# Patient Record
Sex: FEMALE | Race: WHITE | NOT HISPANIC OR LATINO | Employment: FULL TIME | URBAN - METROPOLITAN AREA
[De-identification: names, ages, dates, MRNs, and addresses within clinical notes are randomized per-mention and may not be internally consistent; named-entity substitution may affect disease eponyms.]

---

## 2022-11-04 ENCOUNTER — APPOINTMENT (OUTPATIENT)
Dept: RADIOLOGY | Facility: HOSPITAL | Age: 57
End: 2022-11-04

## 2022-11-04 ENCOUNTER — HOSPITAL ENCOUNTER (EMERGENCY)
Facility: HOSPITAL | Age: 57
Discharge: HOME | End: 2022-11-04
Attending: STUDENT IN AN ORGANIZED HEALTH CARE EDUCATION/TRAINING PROGRAM

## 2022-11-04 VITALS
SYSTOLIC BLOOD PRESSURE: 181 MMHG | BODY MASS INDEX: 29.99 KG/M2 | WEIGHT: 180 LBS | HEART RATE: 71 BPM | TEMPERATURE: 98.1 F | DIASTOLIC BLOOD PRESSURE: 98 MMHG | OXYGEN SATURATION: 100 % | RESPIRATION RATE: 20 BRPM | HEIGHT: 65 IN
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
## 2022-11-04 DIAGNOSIS — S62.101A CLOSED FRACTURE OF RIGHT WRIST, INITIAL ENCOUNTER: ICD-10-CM

## 2022-11-04 RX ORDER — KETOROLAC TROMETHAMINE 30 MG/ML
30 INJECTION, SOLUTION INTRAMUSCULAR; INTRAVENOUS ONCE
Status: COMPLETED | OUTPATIENT
Start: 2022-11-04 | End: 2022-11-04

## 2022-11-04 RX ORDER — HYDROCODONE BITARTRATE AND ACETAMINOPHEN 5; 325 MG/1; MG/1
1 TABLET ORAL EVERY 4 HOURS PRN
Qty: 18 TABLET | Refills: 0 | Status: SHIPPED | OUTPATIENT
Start: 2022-11-04 | End: 2022-11-07

## 2022-11-04 RX ORDER — HYDROCODONE BITARTRATE AND ACETAMINOPHEN 5; 325 MG/1; MG/1
1 TABLET ORAL ONCE
Status: COMPLETED | OUTPATIENT
Start: 2022-11-04 | End: 2022-11-04

## 2022-11-04 RX ORDER — DULAGLUTIDE 1.5 MG/.5ML
1.5 INJECTION, SOLUTION SUBCUTANEOUS WEEKLY
COMMUNITY
Start: 2022-10-30

## 2022-11-04 RX ORDER — KETOROLAC TROMETHAMINE 30 MG/ML
60 INJECTION, SOLUTION INTRAMUSCULAR; INTRAVENOUS ONCE
Status: DISCONTINUED | OUTPATIENT
Start: 2022-11-04 | End: 2022-11-04

## 2022-11-04 RX ADMIN — HYDROCODONE BITARTRATE AND ACETAMINOPHEN 1 TABLET: 5; 325 TABLET ORAL at 14:29

## 2022-11-04 RX ADMIN — KETOROLAC TROMETHAMINE 30 MG: 30 INJECTION, SOLUTION INTRAMUSCULAR at 14:29

## 2022-11-04 NOTE — DISCHARGE INSTRUCTIONS
Keep on splint until follow-up with orthopedic doctor, on Monday as scheduled  Use Tylenol 650 mg every 4 hours or Anti-inflammatories like Advil, Motrin, Ibuprofen, Aleve every 6 hours; you can alternate the 2 medications taking something every 3 hours for pain, if no improvement add Hydrocodone as scheduled

## 2022-11-04 NOTE — ED PROVIDER NOTES
History  Chief Complaint   Patient presents with   • Fall     Pt "I was walking through town when I tripped over a traffic sign  I put my hands out when I fell " Pt denies LOC, CP and SOB  Pt c/o pain in the right arm region      Past Medical History: Diabetes mellitus, Fibromyalgia, OA, Osteopenia   Past Surgical History: B/L CATARACT EXTRACTION, HYSTERECTOMY, Right KNEE ARTHROSCOPY, TONSILECTOMY AND ADNOIDECTOMY, Left WRIST FRACTURE SURGERY  Presents to ED after trip and fall onto outstretched right hand/wrist immed PTA when she was walking through town and tripped over a traffic sign  Now with pain, swelling, deformity of right wrist   Pt denies hitting head, no LOC, no other injuries or complaints  Has been ambulatory since          Prior to Admission Medications   Prescriptions Last Dose Informant Patient Reported? Taking? Dulaglutide (Trulicity) 1 5 FF/1 5LF SOPN   Yes Yes   Sig: Inject 1 5 mg under the skin once a week      Facility-Administered Medications: None       Past Medical History:   Diagnosis Date   • Diabetes mellitus ([facility] [address] )    • Fibromyalgia    • OA (osteoarthritis)    • Osteopenia        Past Surgical History:   Procedure Laterality Date   • CATARACT EXTRACTION Bilateral    • HYSTERECTOMY     • KNEE ARTHROSCOPY Right    • TONSILECTOMY AND ADNOIDECTOMY     • TONSILLECTOMY     • WRIST FRACTURE SURGERY Left        No family history on file  I have reviewed and agree with the history as documented  E-Cigarette/Vaping     E-Cigarette/Vaping Substances     Social History     Tobacco Use   • Smoking status: Never Smoker   Substance Use Topics   • Alcohol use: Not Currently   • Drug use: Not Currently       Review of Systems   Constitutional: Negative for chills and fever  HENT: Negative for hearing loss and sore throat  Eyes: Negative for visual disturbance  Respiratory: Negative for cough and shortness of breath  Cardiovascular: Negative for chest pain     Gastrointestinal: Negative for abdominal pain, diarrhea and vomiting  Genitourinary: Negative for dysuria and frequency  Musculoskeletal: Positive for arthralgias, joint swelling and myalgias  Negative for gait problem  Skin: Negative for wound  Neurological: Negative for dizziness, weakness and numbness  Psychiatric/Behavioral: Negative for behavioral problems  All other systems reviewed and are negative  Physical Exam  Physical Exam  Vitals and nursing note reviewed  Constitutional:       General: She is in acute distress  Appearance: She is well-developed  HENT:      Head: Normocephalic and atraumatic  Right Ear: External ear normal       Left Ear: External ear normal       Nose: Nose normal       Mouth/Throat:      Mouth: Mucous membranes are moist       Pharynx: Oropharynx is clear  Eyes:      Conjunctiva/sclera: Conjunctivae normal    Cardiovascular:      Rate and Rhythm: Normal rate and regular rhythm  Pulmonary:      Effort: Pulmonary effort is normal  No respiratory distress  Breath sounds: Normal breath sounds  Musculoskeletal:         General: Swelling, tenderness and signs of injury present  Cervical back: Normal range of motion and neck supple  No tenderness  Comments: STS, tenderness noted to right wrist with decreased range of motion , distal NV intact,  no pain along digits, mid to proximal forearm and elbow, shoulder   Skin:     General: Skin is warm and dry  Capillary Refill: Capillary refill takes less than 2 seconds  Findings: No bruising or lesion  Neurological:      Mental Status: She is alert  Motor: No weakness        Gait: Gait normal    Psychiatric:         Behavior: Behavior normal          Vital Signs  ED Triage Vitals   Temperature Pulse Respirations Blood Pressure SpO2   11/04/22 1338 11/04/22 1329 11/04/22 1329 11/04/22 1329 11/04/22 1329   98 1 °F (36 7 °C) 71 20 (!) 181/98 100 %      Temp Source Heart Rate Source Patient Position - Orthostatic VS BP Location FiO2 (%)   11/04/22 1338 11/04/22 1329 11/04/22 1329 11/04/22 1329 --   Oral Monitor Sitting Left arm       Pain Score       11/04/22 1329       10 - Worst Possible Pain           Vitals:    11/04/22 1329   BP: (!) 181/98   Pulse: 71   Patient Position - Orthostatic VS: Sitting         Visual Acuity      ED Medications  Medications   HYDROcodone-acetaminophen (NORCO) 5-325 mg per tablet 1 tablet (1 tablet Oral Given 11/4/22 1429)   ketorolac (TORADOL) injection 30 mg (30 mg Intramuscular Given 11/4/22 1429)       Diagnostic Studies  Results Reviewed     None                 XR wrist 3+ views RIGHT   Final Result by Vel Bose MD (11/04 1420)      Acute, comminuted, impacted, intra-articular, and angulated distal radial fracture  The study was marked in [facility] for immediate notification  Workstation performed: [id number]                    Procedures  Splint application    Date/Time: 11/4/2022 3:10 PM  Performed by: Geovanna Perkins PA-C  Authorized by: Geovanna Perkins PA-C   Universal Protocol:  Procedure performed by: (ED tech)  Consent: Verbal consent obtained  Risks and benefits: risks, benefits and alternatives were discussed  Consent given by: patient  Time out: Immediately prior to procedure a "time out" was called to verify the correct patient, procedure, equipment, support staff and site/side marked as required  Patient understanding: patient states understanding of the procedure being performed  Patient identity confirmed: verbally with patient      Pre-procedure details:     Sensation:  Normal    Skin color:  Normal  Procedure details:     Laterality:  Right    Location:  Wrist    Wrist:  R wrist    Splint type:  Sugar tong    Supplies:  Cotton padding, Ortho-Glass and elastic bandage  Post-procedure details:     Pain:  Improved    Sensation:  Normal    Skin color:  Normal    Patient tolerance of procedure:   Tolerated well, no immediate complications  Comments:      Rking placed by tech             ED Course                                             MDM  Number of Diagnoses or Management Options  Closed fracture of right wrist, initial encounter  Fall, initial encounter  Diagnosis management comments: TT with ortho on call, Dr Leigha Corral, agrees with plan to splint and dc to FU as outpt  Pt lives about 4 hrs away; Patient was able to call and get a follow-up appoint with her orthopedist on Monday in the morning where she lives  CD with xrays given to pt/family       Amount and/or Complexity of Data Reviewed  Tests in the radiology section of CPT®: ordered and reviewed        Disposition  Final diagnoses:   Fall, initial encounter   Closed fracture of right wrist, initial encounter     Time reflects when diagnosis was documented in both MDM as applicable and the Disposition within this note     Time User Action Codes Description Comment    11/4/2022  3:06 PM Asher Ramirez [[id number]  [id number]] Fall, initial encounter     11/4/2022  3:06 PM Asher Ramirez [[id number]] Closed fracture of right wrist, initial encounter       ED Disposition     ED Disposition   Discharge    Condition   Stable    Date/Time   Fri Nov 4, 2022  3:06 PM    Comment   Theresa Malagon discharge to home/self care                 Follow-up Information     Follow up With Specialties Details Why Contact Info Additional [facility]   [address] [address] [phone] [address] [address] [phone]    Your ORTHO    Monday in morning as scheduled           Discharge Medication List as of 11/4/2022  3:10 PM      START taking these medications    Details   HYDROcodone-acetaminophen (Norco) 5-325 mg per tablet Take 1 tablet by mouth every 4 (four) hours as needed for pain for up to 3 days Max Daily Amount: 6 tablets, Starting Fri 11/4/2022, Until Mon 11/7/2022 at [id number], Normal         CONTINUE these medications which have NOT CHANGED    Details   Dulaglutide (Trulicity) 1 5 YS/7 6BS SOPN Inject 1 5 mg under the skin once a week, Starting Sun 10/30/2022, Historical Med             No discharge procedures on file      PDMP Review     None          ED Provider  Electronically Signed by           Kiana Naqvi PA-C  11/04/22 1931